# Patient Record
Sex: MALE | Race: WHITE | Employment: FULL TIME | URBAN - METROPOLITAN AREA
[De-identification: names, ages, dates, MRNs, and addresses within clinical notes are randomized per-mention and may not be internally consistent; named-entity substitution may affect disease eponyms.]

---

## 2018-11-07 ENCOUNTER — HOSPITAL ENCOUNTER (EMERGENCY)
Age: 58
Discharge: HOME | End: 2018-11-07
Attending: FAMILY MEDICINE
Payer: COMMERCIAL

## 2018-11-07 ENCOUNTER — APPOINTMENT (OUTPATIENT)
Dept: CT IMAGING | Age: 58
End: 2018-11-07
Payer: COMMERCIAL

## 2018-11-07 ENCOUNTER — APPOINTMENT (OUTPATIENT)
Dept: GENERAL RADIOLOGY | Age: 58
End: 2018-11-07
Payer: COMMERCIAL

## 2018-11-07 VITALS
HEART RATE: 64 BPM | OXYGEN SATURATION: 98 % | DIASTOLIC BLOOD PRESSURE: 64 MMHG | WEIGHT: 175 LBS | BODY MASS INDEX: 26.52 KG/M2 | TEMPERATURE: 98 F | SYSTOLIC BLOOD PRESSURE: 108 MMHG | RESPIRATION RATE: 16 BRPM | HEIGHT: 68 IN

## 2018-11-07 DIAGNOSIS — S42.002A CLOSED DISPLACED FRACTURE OF LEFT CLAVICLE, UNSPECIFIED PART OF CLAVICLE, INITIAL ENCOUNTER: ICD-10-CM

## 2018-11-07 DIAGNOSIS — S62.339A BOXERS FRACTURE, CLOSED, INITIAL ENCOUNTER: ICD-10-CM
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
## 2018-11-07 DIAGNOSIS — S09.90XA INJURY OF HEAD, INITIAL ENCOUNTER: ICD-10-CM

## 2018-11-07 PROCEDURE — 70450 CT HEAD/BRAIN W/O DYE: CPT

## 2018-11-07 PROCEDURE — 29125 APPL SHORT ARM SPLINT STATIC: CPT

## 2018-11-07 PROCEDURE — 99284 EMERGENCY DEPT VISIT MOD MDM: CPT

## 2018-11-07 PROCEDURE — 73110 X-RAY EXAM OF WRIST: CPT

## 2018-11-07 PROCEDURE — 73130 X-RAY EXAM OF HAND: CPT

## 2018-11-07 PROCEDURE — 73030 X-RAY EXAM OF SHOULDER: CPT

## 2018-11-07 ASSESSMENT — PAIN DESCRIPTION - ORIENTATION: ORIENTATION: LEFT

## 2018-11-07 ASSESSMENT — PAIN DESCRIPTION - LOCATION: LOCATION: SHOULDER;HAND;WRIST

## 2018-11-07 ASSESSMENT — PAIN DESCRIPTION - PAIN TYPE: TYPE: ACUTE PAIN

## 2018-11-07 ASSESSMENT — PAIN SCALES - GENERAL: PAINLEVEL_OUTOF10: 10

## 2020-04-21 ENCOUNTER — HOSPITAL ENCOUNTER (OUTPATIENT)
Age: 60
Discharge: HOME | End: 2020-04-21
Payer: COMMERCIAL

## 2020-04-21 LAB — PROSTATE SPECIFIC ANTIGEN: 2.46 NG/ML (ref 0–4)

## 2020-04-21 PROCEDURE — 36415 COLL VENOUS BLD VENIPUNCTURE: CPT

## 2020-04-21 PROCEDURE — 84153 ASSAY OF PSA TOTAL: CPT

## 2024-10-08 ENCOUNTER — HOSPITAL ENCOUNTER (EMERGENCY)
Age: 64
Discharge: HOME | End: 2024-10-08
Attending: EMERGENCY MEDICINE
Payer: COMMERCIAL

## 2024-10-08 ENCOUNTER — APPOINTMENT (OUTPATIENT)
Dept: GENERAL RADIOLOGY | Age: 64
End: 2024-10-08
Payer: COMMERCIAL

## 2024-10-08 VITALS
OXYGEN SATURATION: 98 % | WEIGHT: 165 LBS | DIASTOLIC BLOOD PRESSURE: 82 MMHG | TEMPERATURE: 97.4 F | HEART RATE: 50 BPM | HEIGHT: 68 IN | SYSTOLIC BLOOD PRESSURE: 130 MMHG | BODY MASS INDEX: 25.01 KG/M2 | RESPIRATION RATE: 14 BRPM

## 2024-10-08 DIAGNOSIS — S92.314A CLOSED NONDISPLACED FRACTURE OF FIRST METATARSAL BONE OF RIGHT FOOT, INITIAL ENCOUNTER: Primary | ICD-10-CM

## 2024-10-08 PROCEDURE — 73630 X-RAY EXAM OF FOOT: CPT

## 2024-10-08 PROCEDURE — 6370000000 HC RX 637 (ALT 250 FOR IP): Performed by: EMERGENCY MEDICINE

## 2024-10-08 PROCEDURE — 99283 EMERGENCY DEPT VISIT LOW MDM: CPT

## 2024-10-08 RX ORDER — IBUPROFEN 600 MG/1
600 TABLET, FILM COATED ORAL ONCE
Status: COMPLETED | OUTPATIENT
Start: 2024-10-08 | End: 2024-10-08

## 2024-10-08 RX ORDER — DICLOFENAC POTASSIUM 50 MG/1
50 TABLET, FILM COATED ORAL 3 TIMES DAILY PRN
Qty: 21 TABLET | Refills: 0 | Status: SHIPPED | OUTPATIENT
Start: 2024-10-08 | End: 2024-10-15

## 2024-10-08 RX ADMIN — IBUPROFEN 600 MG: 600 TABLET, FILM COATED ORAL at 09:22

## 2024-10-08 ASSESSMENT — PAIN DESCRIPTION - DESCRIPTORS
DESCRIPTORS: SHARP;ACHING
DESCRIPTORS: SHARP;DISCOMFORT;ACHING

## 2024-10-08 ASSESSMENT — PAIN DESCRIPTION - LOCATION
LOCATION: FOOT
LOCATION: FOOT

## 2024-10-08 ASSESSMENT — PAIN DESCRIPTION - ORIENTATION
ORIENTATION: RIGHT
ORIENTATION: RIGHT

## 2024-10-08 ASSESSMENT — PAIN DESCRIPTION - PAIN TYPE: TYPE: ACUTE PAIN

## 2024-10-08 ASSESSMENT — PAIN SCALES - GENERAL
PAINLEVEL_OUTOF10: 9
PAINLEVEL_OUTOF10: 9

## 2024-10-08 ASSESSMENT — PAIN - FUNCTIONAL ASSESSMENT: PAIN_FUNCTIONAL_ASSESSMENT: 0-10

## 2024-10-08 NOTE — DISCHARGE INSTRUCTIONS
XR FOOT RIGHT (MIN 3 VIEWS)   Final Result   Nondisplaced fracture involving the proximal phalanx of the 1st digit with   associated soft tissue swelling.  Degenerative changes seen the 1st   metatarsophalangeal joint and midfoot with calcaneal spurring.           Call workers comp doctor and foot doctor  and schedule a followup appointment to be seen in 2 days    Have your doctor obtain  finalized report of all results

## 2024-10-10 NOTE — ED PROVIDER NOTES
[facility] Assessment  BP: 130/82  Pulse: 50           PHYSICAL EXAM  1 or more Elements     ED Triage Vitals   BP Systolic BP Percentile Diastolic BP Percentile Temp Temp Source Pulse Respirations SpO2   10/08/24 0835 -- -- 10/08/24 0835 10/08/24 0835 10/08/24 0835 10/08/24 0835 10/08/24 0835   130/82   97.4 °F (36.3 °C) Oral 50 14 98 %      Height Weight - Scale         10/08/24 0851 10/08/24 0851         1.727 m (5' 8\") 74.8 kg (165 lb)             Physical Exam  Vitals reviewed.   Constitutional:       General: He is not in acute distress.     Appearance: Normal appearance. He is not toxic-appearing.   HENT:      Head: Normocephalic and atraumatic.      Right Ear: External ear normal.      Left Ear: External ear normal.      Nose: Nose normal. No congestion.      Mouth/Throat:      Mouth: Mucous membranes are moist.      Pharynx: Oropharynx is clear. No posterior oropharyngeal erythema.   Eyes:      Extraocular Movements: Extraocular movements intact.      Pupils: Pupils are equal, round, and reactive to light.   Cardiovascular:      Rate and Rhythm: Normal rate and regular rhythm.      Pulses: Normal pulses.      Heart sounds: No murmur heard.  Pulmonary:      Effort: Pulmonary effort is normal.      Breath sounds: No wheezing or rhonchi.   Chest:      Chest wall: No tenderness.   Abdominal:      General: Bowel sounds are normal.      Tenderness: There is no abdominal tenderness. There is no right CVA tenderness, left CVA tenderness or guarding.   Musculoskeletal:         General: No swelling or deformity.      Cervical back: Normal range of motion and neck supple. No muscular tenderness.      Comments: Left 1st metatarsal bruising and swelling, pain w rom. No subungal hematoma no laceration   Skin:     General: Skin is warm and dry.      Capillary Refill: Capillary refill takes less than 2 seconds.   Neurological:      General: No focal deficit present.      Mental Status: He is alert and oriented to person,

## 2024-12-17 ENCOUNTER — APPOINTMENT (OUTPATIENT)
Dept: GENERAL RADIOLOGY | Age: 64
End: 2024-12-17
Payer: COMMERCIAL

## 2024-12-17 ENCOUNTER — HOSPITAL ENCOUNTER (EMERGENCY)
Age: 64
Discharge: HOME | End: 2024-12-17
Payer: COMMERCIAL

## 2024-12-17 VITALS
WEIGHT: 165 LBS | SYSTOLIC BLOOD PRESSURE: 140 MMHG | BODY MASS INDEX: 25.09 KG/M2 | TEMPERATURE: 97.6 F | HEART RATE: 59 BPM | RESPIRATION RATE: 16 BRPM | OXYGEN SATURATION: 98 % | DIASTOLIC BLOOD PRESSURE: 72 MMHG

## 2024-12-17 DIAGNOSIS — S92.355A CLOSED NONDISPLACED FRACTURE OF FIFTH METATARSAL BONE OF LEFT FOOT, INITIAL ENCOUNTER: Primary | ICD-10-CM

## 2024-12-17 PROCEDURE — 73610 X-RAY EXAM OF ANKLE: CPT

## 2024-12-17 PROCEDURE — 73630 X-RAY EXAM OF FOOT: CPT

## 2024-12-17 PROCEDURE — 96372 THER/PROPH/DIAG INJ SC/IM: CPT

## 2024-12-17 PROCEDURE — 99284 EMERGENCY DEPT VISIT MOD MDM: CPT

## 2024-12-17 PROCEDURE — 6360000002 HC RX W HCPCS

## 2024-12-17 RX ORDER — KETOROLAC TROMETHAMINE 30 MG/ML
30 INJECTION, SOLUTION INTRAMUSCULAR; INTRAVENOUS ONCE
Status: COMPLETED | OUTPATIENT
Start: 2024-12-17 | End: 2024-12-17

## 2024-12-17 RX ADMIN — KETOROLAC TROMETHAMINE 30 MG: 30 INJECTION, SOLUTION INTRAMUSCULAR at 16:08

## 2024-12-17 ASSESSMENT — PAIN DESCRIPTION - LOCATION
LOCATION: FOOT
LOCATION: FOOT

## 2024-12-17 ASSESSMENT — PAIN DESCRIPTION - ORIENTATION
ORIENTATION: LEFT
ORIENTATION: LEFT

## 2024-12-17 ASSESSMENT — PAIN DESCRIPTION - DESCRIPTORS
DESCRIPTORS: SHARP;DISCOMFORT;ACHING
DESCRIPTORS: ACHING;DISCOMFORT;DULL

## 2024-12-17 ASSESSMENT — ENCOUNTER SYMPTOMS
ALLERGIC/IMMUNOLOGIC NEGATIVE: 1
GASTROINTESTINAL NEGATIVE: 1
EYES NEGATIVE: 1
RESPIRATORY NEGATIVE: 1

## 2024-12-17 ASSESSMENT — PAIN SCALES - GENERAL
PAINLEVEL_OUTOF10: 9
PAINLEVEL_OUTOF10: 9

## 2024-12-17 ASSESSMENT — PAIN - FUNCTIONAL ASSESSMENT: PAIN_FUNCTIONAL_ASSESSMENT: 0-10

## 2024-12-17 ASSESSMENT — PAIN DESCRIPTION - PAIN TYPE: TYPE: ACUTE PAIN

## 2024-12-17 NOTE — ED PROVIDER NOTES
[facility] EMERGENCY DEPARTMENT  EMERGENCY DEPARTMENT ENCOUNTER        Pt Name: Ap Corado  MRN: [id number]  Birthdate 1960  Date of evaluation: 12/17/2024  Provider: BIB Cobb - CNP  PCP: Greg Feliz DO  Note Started: 3:28 PM EST 12/17/24      XANDER. I have evaluated this patient.        CHIEF COMPLAINT       Chief Complaint   Patient presents with    Foot Injury     Pt slipped on a step in garage and injured left foot today       HISTORY OF PRESENT ILLNESS: 1 or more Elements     History from : Patient    Limitations to history : None    Ap Corado is a 64 y.o. male who presents to the ED with complaints of a left foot and ankle injury that occurred approximately 1-1/2 hours prior to arrival.  Patient states he was lifting a bike rack on the stoop in the garage when he missed the last step and injured his foot and ankle.  Patient states he was able to ambulate after the fall.  Patient states that the pain has increased since initial injury.  Patient denies any numbness, tingling, weakness.  Patient states he believes his tetanus is up-to-date due to a broken right foot 1 year ago.  Patient denies any other injury.  Patient denies any chest pain, shortness of breath, abdominal pain, headache, lightheadedness, dizziness, fever, chills, body aches.  Patient denies any other symptoms.  Patient has no other complaints at this time.    Nursing Notes were all reviewed and agreed with or any disagreements were addressed in the HPI.    REVIEW OF SYSTEMS :      Review of Systems   Constitutional: Negative.    HENT: Negative.     Eyes: Negative.    Respiratory: Negative.     Cardiovascular: Negative.    Gastrointestinal: Negative.    Endocrine: Negative.    Genitourinary: Negative.    Musculoskeletal:         Left foot and ankle pain   Skin: Negative.    Allergic/Immunologic: Negative.    Neurological: Negative.    Hematological: Negative.

## 2024-12-17 NOTE — DISCHARGE INSTRUCTIONS
XR ANKLE LEFT (MIN 3 VIEWS)   Final Result   No acute abnormality of the ankle.         XR FOOT LEFT (MIN 3 VIEWS)   Final Result   Nondisplaced oblique fracture through the mid distal 5th metatarsal.